# Patient Record
Sex: MALE | Race: WHITE | NOT HISPANIC OR LATINO | Employment: FULL TIME | URBAN - METROPOLITAN AREA
[De-identification: names, ages, dates, MRNs, and addresses within clinical notes are randomized per-mention and may not be internally consistent; named-entity substitution may affect disease eponyms.]

---

## 2023-05-15 LAB
GRAM STAIN: NORMAL
TISSUE/WOUND CULTURE/SMEAR: NORMAL

## 2023-12-28 ENCOUNTER — PATIENT OUTREACH (OUTPATIENT)
Dept: PRIMARY CARE | Facility: CLINIC | Age: 38
End: 2023-12-28
Payer: COMMERCIAL

## 2023-12-28 NOTE — PROGRESS NOTES
Discharge Facility: [facility]  Discharge Diagnosis: Acute Cholecystitis   Admission Date: 26 Dec 23  Discharge Date: 27 Dec 23    PCP Appointment Date: Tasked to PCP office for scheduling.   Specialist Appointment Date: pt states he is set to follow up with surgery though did not remember date.   Hospital Encounter and Summary: Linked but no charting available in link at this time    See discharge assessment below for further details      Engagement  Call Start Time: 1033 (12/28/2023 10:43 AM)    Medications  Medications reviewed with patient/caregiver?: Yes (12/28/2023 10:43 AM)  Is the patient having any side effects they believe may be caused by any medication additions or changes?: No (12/28/2023 10:43 AM)  Does the patient have all medications ordered at discharge?: Yes (12/28/2023 10:43 AM)  Prescription Comments: None (12/28/2023 10:43 AM)  Is the patient taking all medications as directed (includes completed medication regime)?: Yes (12/28/2023 10:43 AM)  Medication Comments: pt stating he has no questions, concerns, or issues with medications at this time (12/28/2023 10:43 AM)    Appointments  Does the patient have a primary care provider?: Yes (tasked to office for scheduling.) (12/28/2023 10:43 AM)  Has the patient kept scheduled appointments due by today?: Yes (12/28/2023 10:43 AM)    Self Management  What is the home health agency?: N/A (12/28/2023 10:43 AM)  Has home health visited the patient within 72 hours of discharge?: Not applicable (12/28/2023 10:43 AM)  What Durable Medical Equipment (DME) was ordered?: N/A (12/28/2023 10:43 AM)    Patient Teaching  Does the patient have access to their discharge instructions?: Yes (12/28/2023 10:43 AM)  Care Management Interventions: Reviewed instructions with patient (12/28/2023 10:43 AM)  What is the patient's perception of their health status since discharge?: Improving (12/28/2023 10:43 AM)  Is the patient/caregiver able to teach back the hierarchy of who to  call/visit for symptoms/problems? PCP, Specialist, Home Health nurse, Urgent Care, ED, 911: Yes (12/28/2023 10:43 AM)  Patient/Caregiver Education Comments: None (12/28/2023 10:43 AM)    Wrap Up  Wrap Up Additional Comments: None (12/28/2023 10:43 AM)  Call End Time: 1043 (12/28/2023 10:43 AM)     Pt grateful for outreach call and is agreeable to being contacted in 2 wks to see how he is doing.

## 2024-01-10 ENCOUNTER — PATIENT OUTREACH (OUTPATIENT)
Dept: PRIMARY CARE | Facility: CLINIC | Age: 39
End: 2024-01-10
Payer: COMMERCIAL

## 2024-01-10 NOTE — PROGRESS NOTES
Unable to reach patient for call back.   LVM with call back number for patient to call if needed   If no voicemail available call attempts x 2 were made to contact the patient to assist with any questions or concerns patient may have.

## 2024-01-24 ENCOUNTER — PATIENT OUTREACH (OUTPATIENT)
Dept: PRIMARY CARE | Facility: CLINIC | Age: 39
End: 2024-01-24
Payer: COMMERCIAL

## 2024-02-16 ENCOUNTER — APPOINTMENT (OUTPATIENT)
Dept: PRIMARY CARE | Facility: CLINIC | Age: 39
End: 2024-02-16
Payer: COMMERCIAL

## 2024-03-26 ENCOUNTER — PATIENT OUTREACH (OUTPATIENT)
Dept: PRIMARY CARE | Facility: CLINIC | Age: 39
End: 2024-03-26
Payer: COMMERCIAL

## 2024-03-26 NOTE — PROGRESS NOTES
90 day outreach complete. Pt ended phone call after I identified myself so was unable to see if patient had any questions or to see how patient was doing. TCM program closed.

## 2024-12-16 ENCOUNTER — HOSPITAL ENCOUNTER (EMERGENCY)
Facility: HOSPITAL | Age: 39
Discharge: HOME | End: 2024-12-16
Payer: OTHER GOVERNMENT

## 2024-12-16 VITALS
HEART RATE: 99 BPM | SYSTOLIC BLOOD PRESSURE: 122 MMHG | WEIGHT: 160 LBS | RESPIRATION RATE: 18 BRPM | DIASTOLIC BLOOD PRESSURE: 71 MMHG | TEMPERATURE: 99.5 F | HEIGHT: 74 IN | OXYGEN SATURATION: 96 % | BODY MASS INDEX: 20.53 KG/M2

## 2024-12-16 DIAGNOSIS — R11.2 NAUSEA AND VOMITING, UNSPECIFIED VOMITING TYPE: Primary | ICD-10-CM

## 2024-12-16 LAB
FLUAV RNA RESP QL NAA+PROBE: NOT DETECTED
FLUBV RNA RESP QL NAA+PROBE: NOT DETECTED
SARS-COV-2 RNA RESP QL NAA+PROBE: NOT DETECTED

## 2024-12-16 PROCEDURE — 87636 SARSCOV2 & INF A&B AMP PRB: CPT | Performed by: PHYSICIAN ASSISTANT

## 2024-12-16 PROCEDURE — 2500000001 HC RX 250 WO HCPCS SELF ADMINISTERED DRUGS (ALT 637 FOR MEDICARE OP): Performed by: PHYSICIAN ASSISTANT

## 2024-12-16 PROCEDURE — 2500000005 HC RX 250 GENERAL PHARMACY W/O HCPCS: Performed by: PHYSICIAN ASSISTANT

## 2024-12-16 PROCEDURE — 99283 EMERGENCY DEPT VISIT LOW MDM: CPT

## 2024-12-16 RX ORDER — ACETAMINOPHEN 325 MG/1
975 TABLET ORAL ONCE
Status: COMPLETED | OUTPATIENT
Start: 2024-12-16 | End: 2024-12-16

## 2024-12-16 RX ORDER — ONDANSETRON 4 MG/1
4 TABLET, ORALLY DISINTEGRATING ORAL EVERY 8 HOURS PRN
Qty: 20 TABLET | Refills: 0 | Status: SHIPPED | OUTPATIENT
Start: 2024-12-16 | End: 2024-12-23

## 2024-12-16 RX ORDER — ONDANSETRON 4 MG/1
8 TABLET, ORALLY DISINTEGRATING ORAL ONCE
Status: COMPLETED | OUTPATIENT
Start: 2024-12-16 | End: 2024-12-16

## 2024-12-16 RX ADMIN — ACETAMINOPHEN 975 MG: 325 TABLET ORAL at 13:20

## 2024-12-16 RX ADMIN — ONDANSETRON 8 MG: 4 TABLET, ORALLY DISINTEGRATING ORAL at 13:20

## 2024-12-16 ASSESSMENT — COLUMBIA-SUICIDE SEVERITY RATING SCALE - C-SSRS
1. IN THE PAST MONTH, HAVE YOU WISHED YOU WERE DEAD OR WISHED YOU COULD GO TO SLEEP AND NOT WAKE UP?: NO
2. HAVE YOU ACTUALLY HAD ANY THOUGHTS OF KILLING YOURSELF?: NO
6. HAVE YOU EVER DONE ANYTHING, STARTED TO DO ANYTHING, OR PREPARED TO DO ANYTHING TO END YOUR LIFE?: NO

## 2024-12-16 ASSESSMENT — PAIN DESCRIPTION - LOCATION: LOCATION: BACK

## 2024-12-16 ASSESSMENT — PAIN - FUNCTIONAL ASSESSMENT: PAIN_FUNCTIONAL_ASSESSMENT: 0-10

## 2024-12-16 ASSESSMENT — LIFESTYLE VARIABLES
HAVE YOU EVER FELT YOU SHOULD CUT DOWN ON YOUR DRINKING: NO
EVER HAD A DRINK FIRST THING IN THE MORNING TO STEADY YOUR NERVES TO GET RID OF A HANGOVER: NO
EVER FELT BAD OR GUILTY ABOUT YOUR DRINKING: NO
TOTAL SCORE: 0
HAVE PEOPLE ANNOYED YOU BY CRITICIZING YOUR DRINKING: NO

## 2024-12-16 ASSESSMENT — PAIN DESCRIPTION - ORIENTATION: ORIENTATION: LOWER

## 2024-12-16 ASSESSMENT — PAIN DESCRIPTION - DESCRIPTORS: DESCRIPTORS: ACHING

## 2024-12-16 ASSESSMENT — PAIN SCALES - GENERAL: PAINLEVEL_OUTOF10: 6

## 2024-12-16 NOTE — ED NOTES
Pt arrives with c/o n/v/d and fever beginning this AM. Pt reports other family members recently sick in the home. Pt currently in no obvious distress.      Darius West RN  12/16/24 3301

## 2024-12-16 NOTE — ED TRIAGE NOTES
Pt here for V x4 of undigested food, D, fever, lower back pain since this morning. Dizziness when standing and did report loc x1 earlier this morning.

## 2024-12-16 NOTE — ED PROVIDER NOTES
HPI   Chief Complaint   Patient presents with    Vomiting    Dizziness       39-year-old male here with his son who is here for the same complaint of nausea vomiting.  Everyone in the home has had gastrointestinal viral upset over the past several days.  He and his son are both unable to take p.o.  Without vomiting.  Also states he has had a mild fever at home.  Was unable to take Tylenol for his fever.  Denies any chest pain abdominal pain shortness of breath.  Overall just does not feel well.  No neck pain              Patient History   Past Medical History:   Diagnosis Date    Personal history of other diseases of the respiratory system     History of asthma     Past Surgical History:   Procedure Laterality Date    OTHER SURGICAL HISTORY  05/20/2022    Vasectomy    OTHER SURGICAL HISTORY  05/20/2022    Tonsillectomy    OTHER SURGICAL HISTORY  05/20/2022    Anal fissurectomy     No family history on file.  Social History     Tobacco Use    Smoking status: Every Day     Current packs/day: 0.50     Types: Cigarettes    Smokeless tobacco: Never   Substance Use Topics    Alcohol use: Not on file    Drug use: Never       Physical Exam   ED Triage Vitals [12/16/24 1220]   Temperature Heart Rate Respirations BP   (!) 38.3 °C (100.9 °F) 89 17 106/73      Pulse Ox Temp Source Heart Rate Source Patient Position   96 % Temporal Monitor Sitting      BP Location FiO2 (%)     Left arm --       Physical Exam  Vitals reviewed.   Constitutional:       General: He is not in acute distress.  HENT:      Head: Normocephalic and atraumatic.      Right Ear: Tympanic membrane normal.      Left Ear: Tympanic membrane normal.      Nose: Nose normal.      Mouth/Throat:      Mouth: Mucous membranes are moist.      Pharynx: Oropharynx is clear.   Eyes:      Extraocular Movements: Extraocular movements intact.      Pupils: Pupils are equal, round, and reactive to light.   Cardiovascular:      Rate and Rhythm: Normal rate and regular rhythm.       Pulses: Normal pulses.      Heart sounds: Normal heart sounds. No murmur heard.     No friction rub. No gallop.   Pulmonary:      Effort: Pulmonary effort is normal. No respiratory distress.      Breath sounds: Normal breath sounds. No wheezing or rhonchi.   Chest:      Chest wall: No tenderness.   Abdominal:      General: There is no distension.      Palpations: Abdomen is soft.      Tenderness: There is no abdominal tenderness.   Musculoskeletal:         General: Normal range of motion.      Cervical back: Normal range of motion. No tenderness.   Skin:     General: Skin is warm and dry.      Capillary Refill: Capillary refill takes less than 2 seconds.   Neurological:      General: No focal deficit present.      Mental Status: He is alert and oriented to person, place, and time.      Sensory: No sensory deficit.      Motor: No weakness.   Psychiatric:         Mood and Affect: Mood normal.         Behavior: Behavior normal.           ED Course & MDM   Diagnoses as of 12/16/24 1450   Nausea and vomiting, unspecified vomiting type                 No data recorded     [place] Coma Scale Score: 15 (12/16/24 1219 : Millie Husain RN)                           Medical Decision Making  39-year-old male presenting with nausea vomiting.  Patient was prescribed ODT Zofran and did successfully pass a p.o. challenge.  I additionally did send viral swabs which were negative.  He also received Tylenol and is now afebrile.  At this time we will discharge patient home with ODT Zofran.  He is to follow-up with his primary care provider as needed.  Given strict return precautions should he become lethargic or unable to tolerate p.o. after ODT Zofran.        Procedure  Procedures     Jonathan Fernandez PA-C  12/16/24 1459

## 2024-12-16 NOTE — Clinical Note
Castro Duran was seen and treated in our emergency department on 12/16/2024.  He may return to work on 12/20/2024.       If you have any questions or concerns, please don't hesitate to call.      Jonathan Fernandez PA-C

## 2025-07-28 ENCOUNTER — OFFICE VISIT (OUTPATIENT)
Dept: URGENT CARE | Age: 40
End: 2025-07-28
Payer: COMMERCIAL

## 2025-07-28 ENCOUNTER — ANCILLARY PROCEDURE (OUTPATIENT)
Dept: URGENT CARE | Age: 40
End: 2025-07-28
Payer: COMMERCIAL

## 2025-07-28 VITALS
DIASTOLIC BLOOD PRESSURE: 79 MMHG | HEART RATE: 64 BPM | WEIGHT: 165 LBS | RESPIRATION RATE: 20 BRPM | TEMPERATURE: 97.8 F | OXYGEN SATURATION: 97 % | BODY MASS INDEX: 21.17 KG/M2 | SYSTOLIC BLOOD PRESSURE: 109 MMHG | HEIGHT: 74 IN

## 2025-07-28 DIAGNOSIS — T14.8XXA CONTUSION OF CLAVICLE, INITIAL ENCOUNTER: ICD-10-CM

## 2025-07-28 DIAGNOSIS — S49.92XA INJURY OF LEFT CLAVICLE, INITIAL ENCOUNTER: Primary | ICD-10-CM

## 2025-07-28 DIAGNOSIS — S49.92XA INJURY OF LEFT CLAVICLE, INITIAL ENCOUNTER: ICD-10-CM

## 2025-07-28 RX ORDER — SUMATRIPTAN SUCCINATE 100 MG/1
TABLET ORAL DAILY PRN
COMMUNITY

## 2025-07-28 NOTE — PATIENT INSTRUCTIONS
You can discontinue your sling that you are wearing.  You can take Tylenol, ibuprofen, or naproxen at home as needed for pain and swelling.  You can ice the left clavicle.

## 2025-07-28 NOTE — PROGRESS NOTES
"Subjective   Patient ID: Castro Duran is a 40 y.o. male. They present today with a chief complaint of left clavicle injury ([facility].   Pt c/o left clavicle injury at approx 5:00pm today 7/28/25, was unhooking a hoist and when it released it jumped and hit him in the clavicle.).    History of Present Illness  HPI  Patient states he was at work this evening around 5:00 when he unhooked moist and did not realize how much tension it was under.  The hoist jumped and hit him in the left clavicle.  Patient states that he saw his  at work who made a sling for him and advised him to come to urgent care.  Patient has not taken any medication for pain since the incident.  Patient denies any numbness, tingling.  Patient states there is some radiation of pain going up the left side of his neck.    Past Medical History  Allergies as of 07/28/2025    (No Known Allergies)       Prescriptions Prior to Admission[1]     Medical History[2]    Surgical History[3]     reports that he has been smoking cigarettes. He has never used smokeless tobacco. He reports that he does not use drugs.    Review of Systems  Review of Systems  ROS is negative unless otherwise stated in HPI.                                Objective    Vitals:    07/28/25 1833   BP: 109/79   BP Location: Left arm   Patient Position: Sitting   BP Cuff Size: Adult   Pulse: 64   Resp: 20   Temp: 36.6 °C (97.8 °F)   TempSrc: Oral   SpO2: 97%   Weight: 74.8 kg (165 lb)   Height: 1.88 m (6' 2\")     No LMP for male patient.    Physical Exam  VS: As documented in the triage note and EMR flowsheet from this visit was reviewed  General: Well appearing. No acute distress.   Eyes:  Extraocular movements grossly intact. No scleral icterus.   Head: Atraumatic. Normocephalic.     Neck: No meningismus. No gross masses. Full movement through range of motion  MSK: To the left clavicle there is a small abrasion present without active bleeding.  There is visible edema ecchymosis of " the left clavicle.    Skin: Warm, dry. No rashes  Neuro: CN II-VII intact. A&O x3. Speech fluent. Alert. Moving all extremities. Ambulates with normal gait  Psych: Appropriate mood and affect for situation    Procedures    Point of Care Test & Imaging Results from this visit  No results found for this visit on 07/28/25.   Imaging  XR clavicle left  Addendum Date: 7/28/2025  Interpreted By:  Jackson Pérez, ADDENDUM: The original report mistakenly sided the right clavicle may impression.   The images are of the left clavicle.   The conclusion is unchanged.   Conclusion: Normal left clavicle radiographs.   Signed by: Jackson Pérez 7/28/2025 7:05 PM   -------- ORIGINAL REPORT -------- Dictation workstation:   [id number]    Result Date: 7/28/2025  Normal radiographs right clavicle.   Signed by: Jackson Pérez 7/28/2025 7:00 PM Dictation workstation:   [id number]      Cardiology, Vascular, and Other Imaging  No other imaging results found for the past 2 days      Diagnostic study results (if any) were reviewed by Jaymie Reed PA-C.    Assessment/Plan   Allergies, medications, history, and pertinent labs/EKGs/Imaging reviewed by Jaymie Reed PA-C.     Medical Decision Making  The patient was evaluated for above complaints in HPI.  Left clavicular x-ray performed.  The patient has contusion of left clavicle. Patient educated on treatment plan consisting of resting left upper extremity, resuming normal activity as tolerated, over-the-counter NSAIDs and/or Tylenol as needed.  Patient will follow-up with occupational medicine as appropriate due to Worker's Compensation claim.    Orders and Diagnoses  Diagnoses and all orders for this visit:  Injury of left clavicle, initial encounter  -     XR clavicle left; Future  Contusion of clavicle, initial encounter      Medical Admin Record      Patient disposition: Home    Electronically signed by Jaymie Reed PA-C  7:07 PM           [1] (Not in a hospital admission)   [2]   Past  Medical History:  Diagnosis Date    Personal history of other diseases of the respiratory system     History of asthma   [3]   Past Surgical History:  Procedure Laterality Date    OTHER SURGICAL HISTORY  05/20/2022    Vasectomy    OTHER SURGICAL HISTORY  05/20/2022    Tonsillectomy    OTHER SURGICAL HISTORY  05/20/2022    Anal fissurectomy